# Patient Record
Sex: FEMALE | Race: WHITE
[De-identification: names, ages, dates, MRNs, and addresses within clinical notes are randomized per-mention and may not be internally consistent; named-entity substitution may affect disease eponyms.]

---

## 2024-01-22 ENCOUNTER — DASHBOARD ENCOUNTERS (OUTPATIENT)
Age: 49
End: 2024-01-22

## 2024-01-22 ENCOUNTER — OFFICE VISIT (OUTPATIENT)
Dept: URBAN - METROPOLITAN AREA CLINIC 82 | Facility: CLINIC | Age: 49
End: 2024-01-22
Payer: MEDICAID

## 2024-01-22 VITALS
WEIGHT: 206 LBS | BODY MASS INDEX: 36.5 KG/M2 | DIASTOLIC BLOOD PRESSURE: 72 MMHG | SYSTOLIC BLOOD PRESSURE: 106 MMHG | HEIGHT: 63 IN | HEART RATE: 77 BPM | TEMPERATURE: 97.3 F

## 2024-01-22 DIAGNOSIS — Q39.4 ESOPHAGEAL WEB: ICD-10-CM

## 2024-01-22 DIAGNOSIS — Z12.11 SCREENING FOR COLON CANCER: ICD-10-CM

## 2024-01-22 DIAGNOSIS — K22.2 SCHATZKI'S RING: ICD-10-CM

## 2024-01-22 DIAGNOSIS — K21.9 GERD WITHOUT ESOPHAGITIS: ICD-10-CM

## 2024-01-22 PROBLEM — 266435005: Status: ACTIVE | Noted: 2024-01-22

## 2024-01-22 PROBLEM — 305058001: Status: ACTIVE | Noted: 2024-01-22

## 2024-01-22 PROBLEM — 19216006: Status: ACTIVE | Noted: 2024-01-22

## 2024-01-22 PROBLEM — 235623002: Status: ACTIVE | Noted: 2024-01-22

## 2024-01-22 PROCEDURE — 99204 OFFICE O/P NEW MOD 45 MIN: CPT | Performed by: STUDENT IN AN ORGANIZED HEALTH CARE EDUCATION/TRAINING PROGRAM

## 2024-01-22 PROCEDURE — 99244 OFF/OP CNSLTJ NEW/EST MOD 40: CPT | Performed by: STUDENT IN AN ORGANIZED HEALTH CARE EDUCATION/TRAINING PROGRAM

## 2024-01-22 RX ORDER — OMEPRAZOLE 40 MG/1
TAKE 1 CAPSULE BY MOUTH EVERY DAY BEFORE MEAL CAPSULE, DELAYED RELEASE PELLETS ORAL
Qty: 30 | Refills: 3 | Status: DISCONTINUED | COMMUNITY
Start: 2018-12-24

## 2024-01-22 RX ORDER — FERROUS SULFATE 325(65) MG
1 TABLET TABLET ORAL ONCE A DAY
Status: ACTIVE | COMMUNITY

## 2024-01-22 NOTE — HPI-TODAY'S VISIT:
48 y.o female as referred by Dr. 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 A copy of this note will be sent to the referring provider.   At this visit, patient denies any current dysphagia / odynophagia, N/V, reguritation, choking incidents. She states that w hard big pills like multivitamins, pill would travel down slowly but doesnt feel like its getting stuck. No probs w solids or liquid. She admits of heartburns, controlled w otc ppi.  No abd pain, wt loss, loss of appetite, nocturnal sx. Recent FIT 2023, normal. No melena or hematochezia. Denies any changes in BM. BM mostly daily. Denies any fhx of CRC / gastric cancer.   Last EGD in 2018 w Dr. 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 Mild Schatzki ring. Dilated. Esophageal plaques were found, suspicious for candidiasis. Cells for cytology obtained (bx neg). Small hiatal hernia. Non-bleeding erosive gastropathy. Bx unremarkable.